# Patient Record
Sex: FEMALE | URBAN - METROPOLITAN AREA
[De-identification: names, ages, dates, MRNs, and addresses within clinical notes are randomized per-mention and may not be internally consistent; named-entity substitution may affect disease eponyms.]

---

## 2017-02-17 ENCOUNTER — OFFICE VISIT (OUTPATIENT)
Dept: CARDIOLOGY CLINIC | Age: 72
End: 2017-02-17

## 2017-02-17 VITALS
BODY MASS INDEX: 36.8 KG/M2 | WEIGHT: 200 LBS | SYSTOLIC BLOOD PRESSURE: 108 MMHG | DIASTOLIC BLOOD PRESSURE: 54 MMHG | HEIGHT: 62 IN | OXYGEN SATURATION: 94 % | HEART RATE: 60 BPM

## 2017-02-17 DIAGNOSIS — I10 ESSENTIAL HYPERTENSION: ICD-10-CM

## 2017-02-17 DIAGNOSIS — I21.11 ACUTE ST ELEVATION MYOCARDIAL INFARCTION (STEMI) INVOLVING RIGHT CORONARY ARTERY (HCC): Primary | ICD-10-CM

## 2017-02-17 DIAGNOSIS — Z72.0 TOBACCO ABUSE: ICD-10-CM

## 2017-02-17 DIAGNOSIS — I73.9 PAD (PERIPHERAL ARTERY DISEASE) (HCC): ICD-10-CM

## 2017-02-17 DIAGNOSIS — I25.10 CAD IN NATIVE ARTERY: ICD-10-CM

## 2017-02-17 DIAGNOSIS — E78.5 HYPERLIPIDEMIA LDL GOAL <70: ICD-10-CM

## 2017-02-17 PROCEDURE — G8400 PT W/DXA NO RESULTS DOC: HCPCS | Performed by: NURSE PRACTITIONER

## 2017-02-17 PROCEDURE — 99214 OFFICE O/P EST MOD 30 MIN: CPT | Performed by: NURSE PRACTITIONER

## 2017-02-17 PROCEDURE — 1036F TOBACCO NON-USER: CPT | Performed by: NURSE PRACTITIONER

## 2017-02-17 PROCEDURE — 1111F DSCHRG MED/CURRENT MED MERGE: CPT | Performed by: NURSE PRACTITIONER

## 2017-02-17 PROCEDURE — 3017F COLORECTAL CA SCREEN DOC REV: CPT | Performed by: NURSE PRACTITIONER

## 2017-02-17 PROCEDURE — 1090F PRES/ABSN URINE INCON ASSESS: CPT | Performed by: NURSE PRACTITIONER

## 2017-02-17 PROCEDURE — G8417 CALC BMI ABV UP PARAM F/U: HCPCS | Performed by: NURSE PRACTITIONER

## 2017-02-17 PROCEDURE — G8427 DOCREV CUR MEDS BY ELIG CLIN: HCPCS | Performed by: NURSE PRACTITIONER

## 2017-02-17 PROCEDURE — G8484 FLU IMMUNIZE NO ADMIN: HCPCS | Performed by: NURSE PRACTITIONER

## 2017-02-17 PROCEDURE — 4040F PNEUMOC VAC/ADMIN/RCVD: CPT | Performed by: NURSE PRACTITIONER

## 2017-02-17 PROCEDURE — 3014F SCREEN MAMMO DOC REV: CPT | Performed by: NURSE PRACTITIONER

## 2017-02-17 PROCEDURE — 1123F ACP DISCUSS/DSCN MKR DOCD: CPT | Performed by: NURSE PRACTITIONER

## 2017-02-17 PROCEDURE — G8598 ASA/ANTIPLAT THER USED: HCPCS | Performed by: NURSE PRACTITIONER

## 2017-02-23 ENCOUNTER — TELEPHONE (OUTPATIENT)
Dept: CARDIOLOGY CLINIC | Age: 72
End: 2017-02-23

## 2017-03-06 ENCOUNTER — TELEPHONE (OUTPATIENT)
Dept: CARDIOLOGY CLINIC | Age: 72
End: 2017-03-06

## 2017-03-13 ENCOUNTER — OFFICE VISIT (OUTPATIENT)
Dept: CARDIOLOGY CLINIC | Age: 72
End: 2017-03-13

## 2017-03-13 VITALS
WEIGHT: 200.4 LBS | BODY MASS INDEX: 36.88 KG/M2 | HEART RATE: 73 BPM | DIASTOLIC BLOOD PRESSURE: 70 MMHG | OXYGEN SATURATION: 96 % | SYSTOLIC BLOOD PRESSURE: 110 MMHG | HEIGHT: 62 IN

## 2017-03-13 DIAGNOSIS — E78.5 HYPERLIPIDEMIA LDL GOAL <70: ICD-10-CM

## 2017-03-13 DIAGNOSIS — I48.0 PAROXYSMAL ATRIAL FIBRILLATION (HCC): ICD-10-CM

## 2017-03-13 DIAGNOSIS — I25.10 CORONARY ARTERY DISEASE INVOLVING NATIVE CORONARY ARTERY OF NATIVE HEART WITHOUT ANGINA PECTORIS: Primary | ICD-10-CM

## 2017-03-13 DIAGNOSIS — I99.8 LOWER LIMB ISCHEMIA: ICD-10-CM

## 2017-03-13 PROCEDURE — 3017F COLORECTAL CA SCREEN DOC REV: CPT | Performed by: INTERNAL MEDICINE

## 2017-03-13 PROCEDURE — G8400 PT W/DXA NO RESULTS DOC: HCPCS | Performed by: INTERNAL MEDICINE

## 2017-03-13 PROCEDURE — 1090F PRES/ABSN URINE INCON ASSESS: CPT | Performed by: INTERNAL MEDICINE

## 2017-03-13 PROCEDURE — 1036F TOBACCO NON-USER: CPT | Performed by: INTERNAL MEDICINE

## 2017-03-13 PROCEDURE — G8598 ASA/ANTIPLAT THER USED: HCPCS | Performed by: INTERNAL MEDICINE

## 2017-03-13 PROCEDURE — 99214 OFFICE O/P EST MOD 30 MIN: CPT | Performed by: INTERNAL MEDICINE

## 2017-03-13 PROCEDURE — 93000 ELECTROCARDIOGRAM COMPLETE: CPT | Performed by: INTERNAL MEDICINE

## 2017-03-13 PROCEDURE — 3014F SCREEN MAMMO DOC REV: CPT | Performed by: INTERNAL MEDICINE

## 2017-03-13 PROCEDURE — G8417 CALC BMI ABV UP PARAM F/U: HCPCS | Performed by: INTERNAL MEDICINE

## 2017-03-13 PROCEDURE — G8484 FLU IMMUNIZE NO ADMIN: HCPCS | Performed by: INTERNAL MEDICINE

## 2017-03-13 PROCEDURE — 1123F ACP DISCUSS/DSCN MKR DOCD: CPT | Performed by: INTERNAL MEDICINE

## 2017-03-13 PROCEDURE — 4040F PNEUMOC VAC/ADMIN/RCVD: CPT | Performed by: INTERNAL MEDICINE

## 2017-03-13 PROCEDURE — G8427 DOCREV CUR MEDS BY ELIG CLIN: HCPCS | Performed by: INTERNAL MEDICINE

## 2017-11-10 ENCOUNTER — TELEPHONE (OUTPATIENT)
Dept: CARDIOLOGY CLINIC | Age: 72
End: 2017-11-10

## 2017-11-10 ENCOUNTER — OFFICE VISIT (OUTPATIENT)
Dept: CARDIOLOGY CLINIC | Age: 72
End: 2017-11-10

## 2017-11-10 VITALS
HEIGHT: 62 IN | WEIGHT: 184.5 LBS | DIASTOLIC BLOOD PRESSURE: 72 MMHG | OXYGEN SATURATION: 97 % | SYSTOLIC BLOOD PRESSURE: 126 MMHG | HEART RATE: 62 BPM | BODY MASS INDEX: 33.95 KG/M2

## 2017-11-10 DIAGNOSIS — R53.83 FATIGUE, UNSPECIFIED TYPE: ICD-10-CM

## 2017-11-10 DIAGNOSIS — E78.2 MIXED HYPERLIPIDEMIA: ICD-10-CM

## 2017-11-10 DIAGNOSIS — I48.0 PAROXYSMAL ATRIAL FIBRILLATION (HCC): ICD-10-CM

## 2017-11-10 DIAGNOSIS — I25.10 CORONARY ARTERY DISEASE INVOLVING NATIVE CORONARY ARTERY OF NATIVE HEART WITHOUT ANGINA PECTORIS: ICD-10-CM

## 2017-11-10 DIAGNOSIS — I99.8 LIMB ISCHEMIA: ICD-10-CM

## 2017-11-10 DIAGNOSIS — I25.10 CORONARY ARTERY DISEASE INVOLVING NATIVE CORONARY ARTERY OF NATIVE HEART WITHOUT ANGINA PECTORIS: Primary | ICD-10-CM

## 2017-11-10 DIAGNOSIS — Z86.718 HX OF VENOUS THROMBOEMBOLIC DISEASE: ICD-10-CM

## 2017-11-10 LAB
A/G RATIO: 1.8 (ref 1.1–2.2)
ALBUMIN SERPL-MCNC: 3.9 G/DL (ref 3.4–5)
ALP BLD-CCNC: 85 U/L (ref 40–129)
ALT SERPL-CCNC: 10 U/L (ref 10–40)
ANION GAP SERPL CALCULATED.3IONS-SCNC: 12 MMOL/L (ref 3–16)
AST SERPL-CCNC: 14 U/L (ref 15–37)
BILIRUB SERPL-MCNC: <0.2 MG/DL (ref 0–1)
BUN BLDV-MCNC: 24 MG/DL (ref 7–20)
CALCIUM SERPL-MCNC: 9.1 MG/DL (ref 8.3–10.6)
CHLORIDE BLD-SCNC: 101 MMOL/L (ref 99–110)
CHOLESTEROL, TOTAL: 190 MG/DL (ref 0–199)
CO2: 28 MMOL/L (ref 21–32)
CREAT SERPL-MCNC: 1 MG/DL (ref 0.6–1.2)
GFR AFRICAN AMERICAN: >60
GFR NON-AFRICAN AMERICAN: 55
GLOBULIN: 2.2 G/DL
GLUCOSE BLD-MCNC: 91 MG/DL (ref 70–99)
HCT VFR BLD CALC: 36 % (ref 36–48)
HDLC SERPL-MCNC: 29 MG/DL (ref 40–60)
HEMOGLOBIN: 12 G/DL (ref 12–16)
LDL CHOLESTEROL CALCULATED: ABNORMAL MG/DL
LDL CHOLESTEROL DIRECT: 111 MG/DL
MCH RBC QN AUTO: 34.1 PG (ref 26–34)
MCHC RBC AUTO-ENTMCNC: 33.2 G/DL (ref 31–36)
MCV RBC AUTO: 102.8 FL (ref 80–100)
PDW BLD-RTO: 16.5 % (ref 12.4–15.4)
PLATELET # BLD: 241 K/UL (ref 135–450)
PMV BLD AUTO: 9.7 FL (ref 5–10.5)
POTASSIUM SERPL-SCNC: 4.7 MMOL/L (ref 3.5–5.1)
RBC # BLD: 3.51 M/UL (ref 4–5.2)
SODIUM BLD-SCNC: 141 MMOL/L (ref 136–145)
TOTAL PROTEIN: 6.1 G/DL (ref 6.4–8.2)
TRIGL SERPL-MCNC: 311 MG/DL (ref 0–150)
TSH REFLEX: 2.3 UIU/ML (ref 0.27–4.2)
VLDLC SERPL CALC-MCNC: ABNORMAL MG/DL
WBC # BLD: 7.5 K/UL (ref 4–11)

## 2017-11-10 PROCEDURE — G8427 DOCREV CUR MEDS BY ELIG CLIN: HCPCS | Performed by: INTERNAL MEDICINE

## 2017-11-10 PROCEDURE — G8417 CALC BMI ABV UP PARAM F/U: HCPCS | Performed by: INTERNAL MEDICINE

## 2017-11-10 PROCEDURE — 4040F PNEUMOC VAC/ADMIN/RCVD: CPT | Performed by: INTERNAL MEDICINE

## 2017-11-10 PROCEDURE — 1090F PRES/ABSN URINE INCON ASSESS: CPT | Performed by: INTERNAL MEDICINE

## 2017-11-10 PROCEDURE — 3014F SCREEN MAMMO DOC REV: CPT | Performed by: INTERNAL MEDICINE

## 2017-11-10 PROCEDURE — 93000 ELECTROCARDIOGRAM COMPLETE: CPT | Performed by: INTERNAL MEDICINE

## 2017-11-10 PROCEDURE — 3017F COLORECTAL CA SCREEN DOC REV: CPT | Performed by: INTERNAL MEDICINE

## 2017-11-10 PROCEDURE — 4004F PT TOBACCO SCREEN RCVD TLK: CPT | Performed by: INTERNAL MEDICINE

## 2017-11-10 PROCEDURE — G8400 PT W/DXA NO RESULTS DOC: HCPCS | Performed by: INTERNAL MEDICINE

## 2017-11-10 PROCEDURE — 1123F ACP DISCUSS/DSCN MKR DOCD: CPT | Performed by: INTERNAL MEDICINE

## 2017-11-10 PROCEDURE — G8484 FLU IMMUNIZE NO ADMIN: HCPCS | Performed by: INTERNAL MEDICINE

## 2017-11-10 PROCEDURE — G8598 ASA/ANTIPLAT THER USED: HCPCS | Performed by: INTERNAL MEDICINE

## 2017-11-10 PROCEDURE — 99215 OFFICE O/P EST HI 40 MIN: CPT | Performed by: INTERNAL MEDICINE

## 2017-11-10 RX ORDER — METOPROLOL SUCCINATE 25 MG/1
12.5 TABLET, EXTENDED RELEASE ORAL NIGHTLY
Qty: 30 TABLET | Refills: 6 | Status: SHIPPED | OUTPATIENT
Start: 2017-11-10 | End: 2018-11-15 | Stop reason: SDUPTHER

## 2017-11-10 NOTE — LETTER
left leg is not painful today and she has been able to ambulate on it without issue. No further issues with ulcers. She denies any exertional chest pain or shortness of breath. She denies abnormal bruising or bleeding. She feels she is tolerating her medical therapy and reports compliance. Current Outpatient Prescriptions   Medication Sig Dispense Refill    metoprolol tartrate (LOPRESSOR) 25 MG tablet TAKE 1/2 TABLET BY MOUTH TWICE A DAY 90 tablet 3    aspirin 81 MG tablet Take 81 mg by mouth daily      clopidogrel (PLAVIX) 75 MG tablet Take 75 mg by mouth daily      apixaban (ELIQUIS) 5 MG TABS tablet Take 5 mg by mouth 2 times daily      fluticasone-salmeterol (ADVAIR DISKUS) 100-50 MCG/DOSE diskus inhaler Inhale 1 puff into the lungs every 12 hours.  ipratropium-albuterol (DUONEB) 0.5-2.5 (3) MG/3ML SOLN nebulizer solution Inhale 1 vial into the lungs every 4 hours.  isosorbide mononitrate (IMDUR) 30 MG CR tablet Take 30 mg by mouth every morning.  pantoprazole (PROTONIX) 40 MG tablet Take 40 mg by mouth daily.  metformin (GLUCOPHAGE) 500 MG tablet Take 2 tablets by mouth 2 times daily (with meals). Hold for 48 hours post procedure 1 tablet 0    atorvastatin (LIPITOR) 80 MG tablet Take 80 mg by mouth daily.  hydrochlorothiazide (HYDRODIURIL) 25 MG tablet Take 25 mg by mouth 2 times daily        No current facility-administered medications for this visit.           Objective:     /72 (Site: Left Arm, Position: Sitting, Cuff Size: Large Adult)   Pulse 62   Ht 5' 2\" (1.575 m)   Wt 184 lb 8 oz (83.7 kg)   SpO2 97%   BMI 33.75 kg/m²         Physical Exam:  General:  Awake, alert, NAD, morbidly obese  Skin:  Warm and dry  Neck:  JVD<8, no carotid bruits  Chest:  Clear to auscultation, no wheezes/rhonchi/rales  Cardiovascular:  RRR, normal S1/S2, no M/R/G  Abdomen:  Soft, nontender, +bowel sounds  Extremities:  No edema  Pulses: 2+ bilat carotid    2+ bilat radial 2+ right femoral, 1+ left femoral     Doppler right DP/PT     Faint Doppler left PT      ECG 11/10/17: Sinus rhythm with nonspecific T wave changes    Lab Results   Component Value Date    CHOL 197 02/09/2017    HDL 31 02/09/2017    TRIG 242 02/09/2017     Cath/PCI 2/8/17:  1. Complete heart block requiring temporary transvenous pacemaker placement. 2. Right dominant coronary arterial system with 100% occlusion of the  proximal to mid RCA. This was successfully intervened upon with a 3 mm x 23  mm Xience drug-eluting stent post-dilated to 3.1 mm restoring AUDREY 3 flow and  0% residual stenosis in the vessel. In the left system, there is a very short  left main coronary artery with no significant disease. The circumflex has 25%  diffuse disease. In the LAD, there is 25% mid and proximal disease with no  significant in-stent restenosis in the mid LAD stent. 3. Preserved left ventricular systolic function with LV ejection fraction of 60%. 4. Low left ventricular end diastolic pressure at 10 mmHg. 5. No gradient across the aortic valve on pullback to suggest aortic  stenosis. Assessment:  1. CAD of native coronary arteries without angina  2. Atrial Fibrillation, paroxysmal  3. Chronic Limb Ischemia  4. H/o Venous Thromboembolic Disease  5. Hyperlipidemia with goal LDL<70mg/dL     Plan:     Patient with complaints of worsening fatigue and wanting to sleep \"all the time. \" she takes naps and sleeps through the night without interruption. I am unclear of the etiology at this time but will switch her lopressor to toprol-xl and obtain CMP, CBC, TSH w reflux to further assess. She is not participating an any routine exercise program and have enocuarged her to slowly increase daily aerobic exercise. She has lost weight since we last saw her. She was not able to participate in cardiac rehab secondary to transportation issues.  I would also like for her to complete a lipid

## 2017-11-10 NOTE — PROGRESS NOTES
[address]   Daily Progress Note      CC: \"I am more tired\"     Subjective:   Ms. Candie Brown is a 80 y.o. female with a past medical history significant for type 2 DM, chronic venous thromboembolic disease, PAD, tobacco abuse and CAD with prior LAD stent in 2012 [facility]) who presented to [facility] with chest pain that began about 3am. Her ECG on arrival to [facility] at 5:30am showed acute inferior ischemia. [facility] but later decided conditions were unsafe to fly. She was then transferred by ground. She underwent PCI to her 100% occlusion of the proximal to mid dominant RCA. This was successfully intervened upon with a 3 mm x 23 mm Xience drug-eluting stent post-dilated to 3.1 mm restoring AUDREY 3 flow and 0% residual stenosis in the vessel. She follows with  [facility] for her PAD and had \"some work\" done there in the last year to her left leg. She had some left leg pain with evidence of chronic limb ischemia. She is not on ACE/ARB due to hypotension. She returns to the office today in follow-up. Since we last saw Sujey Perez she reports feeling more fatigued. She relates that she \"wants to sleep all the time. \"  She offers that she continues to smoke 1 cigarettes once daily. She did not participate in cardiac rehab because of transportation issues. She is not working out at home. She takes a nap during the day and sleeps through the night. She denies SOB, YUNG, PND or orthopnea. She is no longer following with Dr. Ramirez Else. Her left leg is not painful today and she has been able to ambulate on it without issue. No further issues with ulcers. She denies any exertional chest pain or shortness of breath. She denies abnormal bruising or bleeding. She feels she is tolerating her medical therapy and reports compliance.      Current Outpatient Prescriptions   Medication Sig Dispense Refill    metoprolol tartrate (LOPRESSOR) 25 MG tablet TAKE 1/2 TABLET BY MOUTH TWICE A DAY 90 tablet 3    aspirin 81 MG tablet Take 81 mg by mouth daily      clopidogrel (PLAVIX) 75 MG tablet Take 75 mg by mouth daily      apixaban (ELIQUIS) 5 MG TABS tablet Take 5 mg by mouth 2 times daily      fluticasone-salmeterol (ADVAIR DISKUS) 100-50 MCG/DOSE diskus inhaler Inhale 1 puff into the lungs every 12 hours.  ipratropium-albuterol (DUONEB) 0.5-2.5 (3) MG/3ML SOLN nebulizer solution Inhale 1 vial into the lungs every 4 hours.  isosorbide mononitrate (IMDUR) 30 MG CR tablet Take 30 mg by mouth every morning.  pantoprazole (PROTONIX) 40 MG tablet Take 40 mg by mouth daily.  metformin (GLUCOPHAGE) 500 MG tablet Take 2 tablets by mouth 2 times daily (with meals). Hold for 48 hours post procedure 1 tablet 0    atorvastatin (LIPITOR) 80 MG tablet Take 80 mg by mouth daily.  hydrochlorothiazide (HYDRODIURIL) 25 MG tablet Take 25 mg by mouth 2 times daily        No current facility-administered medications for this visit. Objective:     /72 (Site: Left Arm, Position: Sitting, Cuff Size: Large Adult)   Pulse 62   Ht 5' 2\" (1.575 m)   Wt 184 lb 8 oz (83.7 kg)   SpO2 97%   BMI 33.75 kg/m²        Physical Exam:  General:  Awake, alert, NAD, morbidly obese  Skin:  Warm and dry  Neck:  JVD<8, no carotid bruits  Chest:  Clear to auscultation, no wheezes/rhonchi/rales  Cardiovascular:  RRR, normal S1/S2, no M/R/G  Abdomen:  Soft, nontender, +bowel sounds  Extremities:  No edema  Pulses: 2+ bilat carotid    2+ bilat radial    2+ right femoral, 1+ left femoral     Doppler right DP/PT     Faint Doppler left PT      ECG 11/10/17: Sinus rhythm with nonspecific T wave changes    Lab Results   Component Value Date    CHOL 197 02/09/2017    HDL 31 02/09/2017    TRIG 242 02/09/2017     Cath/PCI 2/8/17:  1. Complete heart block requiring temporary transvenous pacemaker placement.   2. Right dominant coronary arterial system with 100% occlusion of the  proximal to mid RCA. This was successfully intervened upon with a 3 mm x 23  mm Xience drug-eluting stent post-dilated to 3.1 mm restoring AUDREY 3 flow and  0% residual stenosis in the vessel. In the left system, there is a very short  left main coronary artery with no significant disease. The circumflex has 25%  diffuse disease. In the LAD, there is 25% mid and proximal disease with no  significant in-stent restenosis in the mid LAD stent. 3. Preserved left ventricular systolic function with LV ejection fraction of 60%. 4. Low left ventricular end diastolic pressure at 10 mmHg. 5. No gradient across the aortic valve on pullback to suggest aortic  stenosis. Assessment:  1. CAD of native coronary arteries without angina  2. Atrial Fibrillation, paroxysmal  3. Chronic Limb Ischemia  4. H/o Venous Thromboembolic Disease  5. Hyperlipidemia with goal LDL<70mg/dL  6. Fatigue     Plan:     Patient with complaints of worsening fatigue and wanting to sleep \"all the time. \" she takes naps and sleeps through the night without interruption. I am unclear of the etiology at this time but will switch her lopressor to toprol-xl and obtain CMP, CBC, TSH w reflux to further assess. She is not participating an any routine exercise program and have enocuarged her to slowly increase daily aerobic exercise. She has lost weight since we last saw her. She was not able to participate in cardiac rehab secondary to transportation issues. I would also like for her to complete a lipid profile and LDL direct. She is no longer following with Podiatry as her ulcer has healed without recurrence. Denies BLE pain, cramping, fatigue or weakness. She is on Eliquis 5mg bid for anticoagulation with her paroxysmal atrial fibrillation. She will continue on beta blockade and statin therapy for her CAD. She will stop her aspirin in light of  Eliquis and plavix therapy.  We will see her back in the office in follow-up in 6 months.           Signed:  Renu Sarkar MD

## 2017-11-10 NOTE — TELEPHONE ENCOUNTER
Alayna Mitchell from [address] called in wanting clarification on the dose of Metoprolol Ikerstephanie Wallacefabianahollie should be taking.       You can reach Alayna Mitchell at #[phone]

## 2017-11-12 NOTE — COMMUNICATION BODY
[address]   Daily Progress Note      CC: \"I am more tired\"     Subjective:   Ms. Candie Brown is a 80 y.o. female with a past medical history significant for type 2 DM, chronic venous thromboembolic disease, PAD, tobacco abuse and CAD with prior LAD stent in 2012 [facility]) who presented to [facility] with chest pain that began about 3am. Her ECG on arrival to [facility] at 5:30am showed acute inferior ischemia. [facility] but later decided conditions were unsafe to fly. She was then transferred by ground. She underwent PCI to her 100% occlusion of the proximal to mid dominant RCA. This was successfully intervened upon with a 3 mm x 23 mm Xience drug-eluting stent post-dilated to 3.1 mm restoring AUDREY 3 flow and 0% residual stenosis in the vessel. She follows with  [facility] for her [facility]AD and had \"some work\" done there in the last year to her left leg. She had some left leg pain with evidence of chronic limb ischemia. She is not on ACE/ARB due to hypotension. She returns to the office today in follow-up. Since we last saw Sujey Perez she reports feeling more fatigued. She relates that she \"wants to sleep all the time. \"  She offers that she continues to smoke 1 cigarettes once daily. She did not participate in cardiac rehab because of transportation issues. She is not working out at home. She takes a nap during the day and sleeps through the night. She denies SOB, YUNG, PND or orthopnea. She is no longer following with Dr. Ramirez Else. Her left leg is not painful today and she has been able to ambulate on it without issue. No further issues with ulcers. She denies any exertional chest pain or shortness of breath. She denies abnormal bruising or bleeding. She feels she is tolerating her medical therapy and reports compliance.      Current Outpatient Prescriptions   Medication Sig Dispense Refill    metoprolol tartrate (LOPRESSOR) 25 MG tablet TAKE 1/2 TABLET BY MOUTH TWICE A DAY 90 tablet 3    aspirin 81 MG tablet Take 81 mg by mouth daily      clopidogrel (PLAVIX) 75 MG tablet Take 75 mg by mouth daily      apixaban (ELIQUIS) 5 MG TABS tablet Take 5 mg by mouth 2 times daily      fluticasone-salmeterol (ADVAIR DISKUS) 100-50 MCG/DOSE diskus inhaler Inhale 1 puff into the lungs every 12 hours.  ipratropium-albuterol (DUONEB) 0.5-2.5 (3) MG/3ML SOLN nebulizer solution Inhale 1 vial into the lungs every 4 hours.  isosorbide mononitrate (IMDUR) 30 MG CR tablet Take 30 mg by mouth every morning.  pantoprazole (PROTONIX) 40 MG tablet Take 40 mg by mouth daily.  metformin (GLUCOPHAGE) 500 MG tablet Take 2 tablets by mouth 2 times daily (with meals). Hold for 48 hours post procedure 1 tablet 0    atorvastatin (LIPITOR) 80 MG tablet Take 80 mg by mouth daily.  hydrochlorothiazide (HYDRODIURIL) 25 MG tablet Take 25 mg by mouth 2 times daily        No current facility-administered medications for this visit. Objective:     /72 (Site: Left Arm, Position: Sitting, Cuff Size: Large Adult)   Pulse 62   Ht 5' 2\" (1.575 m)   Wt 184 lb 8 oz (83.7 kg)   SpO2 97%   BMI 33.75 kg/m²        Physical Exam:  General:  Awake, alert, NAD, morbidly obese  Skin:  Warm and dry  Neck:  JVD<8, no carotid bruits  Chest:  Clear to auscultation, no wheezes/rhonchi/rales  Cardiovascular:  RRR, normal S1/S2, no M/R/G  Abdomen:  Soft, nontender, +bowel sounds  Extremities:  No edema  Pulses: 2+ bilat carotid    2+ bilat radial    2+ right femoral, 1+ left femoral     Doppler right DP/PT     Faint Doppler left PT      ECG 11/10/17: Sinus rhythm with nonspecific T wave changes    Lab Results   Component Value Date    CHOL 197 02/09/2017    HDL 31 02/09/2017    TRIG 242 02/09/2017     Cath/PCI 2/8/17:  1. Complete heart block requiring temporary transvenous pacemaker placement.   2. Right dominant coronary arterial system with 100% occlusion of the  proximal to mid RCA. This was successfully intervened upon with a 3 mm x 23  mm Xience drug-eluting stent post-dilated to 3.1 mm restoring AUDREY 3 flow and  0% residual stenosis in the vessel. In the left system, there is a very short  left main coronary artery with no significant disease. The circumflex has 25%  diffuse disease. In the LAD, there is 25% mid and proximal disease with no  significant in-stent restenosis in the mid LAD stent. 3. Preserved left ventricular systolic function with LV ejection fraction of 60%. 4. Low left ventricular end diastolic pressure at 10 mmHg. 5. No gradient across the aortic valve on pullback to suggest aortic  stenosis. Assessment:  1. CAD of native coronary arteries without angina  2. Atrial Fibrillation, paroxysmal  3. Chronic Limb Ischemia  4. H/o Venous Thromboembolic Disease  5. Hyperlipidemia with goal LDL<70mg/dL     Plan:     Patient with complaints of worsening fatigue and wanting to sleep \"all the time. \" she takes naps and sleeps through the night without interruption. I am unclear of the etiology at this time but will switch her lopressor to toprol-xl and obtain CMP, CBC, TSH w reflux to further assess. She is not participating an any routine exercise program and have enocuarged her to slowly increase daily aerobic exercise. She has lost weight since we last saw her. She was not able to participate in cardiac rehab secondary to transportation issues. I would also like for her to complete a lipid profile and LDL direct. She is no longer following with Podiatry as her ulcer has healed without recurrence. Denies BLE pain, cramping, fatigue or weakness. She is on Eliquis 5mg bid for anticoagulation with her paroxysmal atrial fibrillation. She will continue on beta blockade and statin therapy for her CAD. She will stop her aspirin in light of  Eliquis and plavix therapy. We will see her back in the office in follow-up in 6 months.           Signed:  Murray Sorto

## 2017-11-17 RX ORDER — ROSUVASTATIN CALCIUM 40 MG/1
40 TABLET, COATED ORAL EVERY EVENING
Qty: 90 TABLET | Refills: 3 | Status: SHIPPED | OUTPATIENT
Start: 2017-11-17 | End: 2018-10-26 | Stop reason: SDUPTHER

## 2018-10-26 RX ORDER — ROSUVASTATIN CALCIUM 40 MG/1
40 TABLET, COATED ORAL EVERY EVENING
Qty: 90 TABLET | Refills: 0 | Status: SHIPPED | OUTPATIENT
Start: 2018-10-26

## 2018-11-16 RX ORDER — METOPROLOL SUCCINATE 25 MG/1
TABLET, EXTENDED RELEASE ORAL
Qty: 45 TABLET | Refills: 0 | Status: SHIPPED | OUTPATIENT
Start: 2018-11-16